# Patient Record
Sex: FEMALE | Race: WHITE | NOT HISPANIC OR LATINO
[De-identification: names, ages, dates, MRNs, and addresses within clinical notes are randomized per-mention and may not be internally consistent; named-entity substitution may affect disease eponyms.]

---

## 2020-07-01 PROBLEM — Z00.00 ENCOUNTER FOR PREVENTIVE HEALTH EXAMINATION: Status: ACTIVE | Noted: 2020-07-01

## 2023-03-30 NOTE — ASU PATIENT PROFILE, ADULT - NS PREOP UNDERSTANDS INFO
No solid food or milk products after 9:30pm 4/3/2023/ water or clear apple juice no later than 4am DOS/ photo ID and insurance card/ comfortable clothing/  will take  her home/yes

## 2023-03-30 NOTE — ASU PATIENT PROFILE, ADULT - NSICDXPASTSURGICALHX_GEN_ALL_CORE_FT
PAST SURGICAL HISTORY:  History of hip surgery Right    History of partial nephrectomy     S/P ACL reconstruction Left     PAST SURGICAL HISTORY:  History of hip surgery Right Replacement    History of partial nephrectomy     S/P ACL reconstruction Left

## 2023-03-31 RX ORDER — SODIUM CHLORIDE 9 MG/ML
1000 INJECTION, SOLUTION INTRAVENOUS
Refills: 0 | Status: DISCONTINUED | OUTPATIENT
Start: 2023-04-03 | End: 2023-04-03

## 2023-04-03 ENCOUNTER — TRANSCRIPTION ENCOUNTER (OUTPATIENT)
Age: 73
End: 2023-04-03

## 2023-04-03 ENCOUNTER — OUTPATIENT (OUTPATIENT)
Dept: OUTPATIENT SERVICES | Facility: HOSPITAL | Age: 73
LOS: 1 days | Discharge: HOME | End: 2023-04-03

## 2023-04-03 VITALS
WEIGHT: 126.1 LBS | TEMPERATURE: 97 F | DIASTOLIC BLOOD PRESSURE: 84 MMHG | RESPIRATION RATE: 16 BRPM | SYSTOLIC BLOOD PRESSURE: 138 MMHG | HEIGHT: 59 IN | HEART RATE: 64 BPM | OXYGEN SATURATION: 98 %

## 2023-04-03 VITALS
HEART RATE: 67 BPM | RESPIRATION RATE: 14 BRPM | SYSTOLIC BLOOD PRESSURE: 138 MMHG | DIASTOLIC BLOOD PRESSURE: 83 MMHG | TEMPERATURE: 98 F | OXYGEN SATURATION: 96 %

## 2023-04-03 DIAGNOSIS — Z98.890 OTHER SPECIFIED POSTPROCEDURAL STATES: Chronic | ICD-10-CM

## 2023-04-03 DIAGNOSIS — Z90.5 ACQUIRED ABSENCE OF KIDNEY: Chronic | ICD-10-CM

## 2023-04-03 DEVICE — LENS IOL TECNIS PROTEC ZCB00 21.5D
Type: IMPLANTABLE DEVICE | Site: LEFT (LEFT EYE) | Status: NON-FUNCTIONAL
Removed: 2023-04-03

## 2023-04-03 RX ORDER — ACETAMINOPHEN 500 MG
650 TABLET ORAL ONCE
Refills: 0 | Status: COMPLETED | OUTPATIENT
Start: 2023-04-03 | End: 2023-04-03

## 2023-04-03 RX ORDER — TROPICAMIDE 1 %
1 DROPS OPHTHALMIC (EYE)
Refills: 0 | Status: COMPLETED | OUTPATIENT
Start: 2023-04-03 | End: 2023-04-03

## 2023-04-03 RX ORDER — PHENYLEPHRINE HCL 2.5 %
1 DROPS OPHTHALMIC (EYE)
Refills: 0 | Status: COMPLETED | OUTPATIENT
Start: 2023-04-03 | End: 2023-04-03

## 2023-04-03 RX ORDER — KETOROLAC TROMETHAMINE 0.5 %
1 DROPS OPHTHALMIC (EYE)
Refills: 0 | Status: COMPLETED | OUTPATIENT
Start: 2023-04-03 | End: 2023-04-03

## 2023-04-03 RX ADMIN — Medication 1 DROP(S): at 07:21

## 2023-04-03 RX ADMIN — Medication 1 DROP(S): at 07:42

## 2023-04-03 RX ADMIN — Medication 1 DROP(S): at 07:31

## 2023-04-03 RX ADMIN — Medication 1 DROP(S): at 07:20

## 2023-04-03 RX ADMIN — Medication 650 MILLIGRAM(S): at 09:36

## 2023-04-03 RX ADMIN — Medication 1 DROP(S): at 07:43

## 2023-04-03 RX ADMIN — Medication 1 DROP(S): at 07:30

## 2023-04-03 NOTE — OPERATIVE REPORT - OPERATIVE RPOSRT DETAILS
DATE OF SURGERY: 04-    SURGEON NAME:  RAJ BECKMAN MD    ANESTHESIA: MAC    OPERATION: Cataract Extraction with Femtosecond Laser & IOL Implantation Left eye    INTRAOCULAR LENS:    PREOPERATIVE DIAGNOSIS: Cataract & Astigmatism Left eye    POSTOPERATIVE DIAGNOSIS: Same    COMPLICATIONS:	None    ESTIMATED BLOOD LOSS: <1 cc	    PROCEDURE:    The patient was brought to the Femtosecond laser suite at [facility] operating room.  The patient’s left eye was anesthetized with Tetracaine drops.  A Methylene blue marking pen was used to make alignment marks at the limbus at the 3 and 9 o’clock positions.  Next, programming of the laser was reviewed including the program for creation of corneal relaxing incisions for the correction of congenital astigmatism, nuclear lens softening, and capsulotomy.  The patient was then placed in a supine position.  The suction ring for the laser was placed onto the eye.   The fiducial marks on the suction ring were aligned with the Methylene blue marks that had been previously placed on the cornea.  Suction was applied and the suction ring was docked to the laser.   OCT scanning took place and all relevant ocular surfaces were identified.  Next, the pre-programmed treatments for the creation of the corneal relaxing incisions, nuclear lens softening, and capsulotomy were performed.   All pre-programmed treatments were successfully completed.   Next, the suction ring was removed.   The patient was taken to the main operating room.    The  left eye was prepped and draped in the usual fashion.  A wire lid speculum was inserted.  Additional numbing took place with the installation of more Tetracaine.  A Norman scissor was used to open the conjunctiva and Tenon’s in the inferotemporal quadrant.  Next, an anesthetic mixture consisting of 50% 4% Lidocaine, 50% 0.75% Marcaine, and an ampule of Wydase was injected using a BSS cannula.  A total of 3 cc was injected below Tenon’s capsule.  Next, a superior rectus traction suture was placed.  A fornix based conjunctival flap was raised using a Norman scissor.  Hemostasis was achieved using a wet field cautery.   Next, a 69 [place] blade was used to make a vertical incision of about 50% depth, 3 mm length, at 0.5 mm posterior to the limbus.  A [place] blade was then used to dissect a scleral tunnel to the arcades.  Next, a 15-degree superblade was used to create incisions at 10 and 2 o’clock positions.      Following this, a 2.75 mm keratome was used to open the main wound.   Next, MPF Lidocaine was injected into the anterior chamber followed by Epinephrine.  Next, Vision blue was injected into the anterior chamber and irrigated out after a period of 20 seconds.  The anterior chamber was then filled with Amvisc plus.  Next, a capsulorexis forcep was used to remove the anterior lens capsule.  Hydro dissection was then performed.  The lens nucleus was prolapsed into the anterior chamber.  The nucleus was then removed with the phaco tip of the GREGORIA Signature machine.  The cortex was removed with the Transformer IA tip.  The posterior capsule was polished with a [place] scratcher and the sub-incisional cortex was removed using a bimanual technique employing a [place] cannula.      The eye was then filled with Healon.  A posterior chamber intraocular lens was inserted.  The Healon was removed using the Transformer IA tip.  Miochol was injected to constrict the pupil.  The wound was closed superiorly using two interrupted 10-O nylon sutures with the knots buried into the cornea.  The side port incisions were infiltrated with BSS on an air cannula.  Next, injections of Ancef and Solumedrol were delivered sub-conjunctivally.  The traction suture was then cut and removed.  The speculum was removed.   Pilocarpine drops were then instilled into the eye.  The lid was closed.  Ilotycin ointment was applied to the lids.  A patch and shield were then applied.  The patient was returned to post-op holding area in good condition having tolerated the procedure well.      DATE OF SURGERY: 04-    SURGEON NAME:  RAJ BECKMAN MD    ANESTHESIA: MAC    OPERATION: Cataract Extraction with Femtosecond Laser & IOL Implantation Left eye    INTRAOCULAR LENS: CZB00 21.5 D    PREOPERATIVE DIAGNOSIS: Cataract & Astigmatism Left eye    POSTOPERATIVE DIAGNOSIS: Same    COMPLICATIONS:	None    ESTIMATED BLOOD LOSS: <1 cc	    PROCEDURE:    The patient was brought to the Femtosecond laser suite at [facility] operating room.  The patient’s left eye was anesthetized with Tetracaine drops.  A Methylene blue marking pen was used to make alignment marks at the limbus at the 3 and 9 o’clock positions.  Next, programming of the laser was reviewed including the program for creation of corneal relaxing incisions for the correction of congenital astigmatism, nuclear lens softening, and capsulotomy.  The patient was then placed in a supine position.  The suction ring for the laser was placed onto the eye.   The fiducial marks on the suction ring were aligned with the Methylene blue marks that had been previously placed on the cornea.  Suction was applied and the suction ring was docked to the laser.   OCT scanning took place and all relevant ocular surfaces were identified.  Next, the pre-programmed treatments for the creation of the corneal relaxing incisions, nuclear lens softening, and capsulotomy were performed.   All pre-programmed treatments were successfully completed.   Next, the suction ring was removed.   The patient was taken to the main operating room.    The  left eye was prepped and draped in the usual fashion.  A wire lid speculum was inserted.  Additional numbing took place with the installation of more Tetracaine.  A [place] scissor was used to open the conjunctiva and Tenon’s in the inferotemporal quadrant.  Next, an anesthetic mixture consisting of 50% 4% Lidocaine, 50% 0.75% Marcaine, and an ampule of Wydase was injected using a BSS cannula.  A total of 3 cc was injected below Tenon’s capsule.  Next, a superior rectus traction suture was placed.  A fornix based conjunctival flap was raised using a Norman scissor.  Hemostasis was achieved using a wet field cautery.   Next, a 69 [place] blade was used to make a vertical incision of about 50% depth, 3 mm length, at 0.5 mm posterior to the limbus.  A [place] blade was then used to dissect a scleral tunnel to the arcades.  Next, a 15-degree superblade was used to create incisions at 10 and 2 o’clock positions.      Following this, a 2.75 mm keratome was used to open the main wound.   Next, MPF Lidocaine was injected into the anterior chamber followed by Epinephrine.  Next, Vision blue was injected into the anterior chamber and irrigated out after a period of 20 seconds.  The anterior chamber was then filled with Amvisc plus.  Next, a capsulorexis forcep was used to remove the anterior lens capsule.  Hydro dissection was then performed.  The lens nucleus was prolapsed into the anterior chamber.  The nucleus was then removed with the phaco tip of the GREGORIA Signature machine.  The cortex was removed with the Transformer IA tip.  The posterior capsule was polished with a [place] scratcher and the sub-incisional cortex was removed using a bimanual technique employing a [place] cannula.      The eye was then filled with Healon.  A posterior chamber intraocular lens was insertedinto the capsular bag.  The Healon was removed using the Transformer IA tip.  Miochol was injected to constrict the pupil.  The wound was closed superiorly using two interrupted 10-O nylon sutures with the knots buried into the cornea.  The side port incisions were infiltrated with BSS on an air cannula.  Next, injections of Ancef and Solumedrol were delivered sub-conjunctivally.  The traction suture was then cut and removed.  The speculum was removed.   Pilocarpine drops were then instilled into the eye.  The lid was closed.  Ilotycin ointment was applied to the lids.  A patch and shield were then applied.  The patient was returned to post-op holding area in good condition having tolerated the procedure well.

## 2023-04-03 NOTE — ASU DISCHARGE PLAN (ADULT/PEDIATRIC) - ***IN THE EVENT THAT YOU DEVELOP A COMPLICATION AND YOU ARE UNABLE TO REACH YOUR OWN PHYSICIAN, YOU MAY CONTACT:
Received refill request for atorvastatin (LIPITOR) 10 MG tablet, metoPROLOL succinate (TOPROL-XL) 50 MG 24 hr tablet  Request is Not Approved   because refill protocol met approval criteria  LOV and or labs were verified to be correct  Refill sent to pharmacy  Last physical or [facility]V 08/22/2022   Upcoming appointment none       Pt still has current refills on pharmacy files.    Statement Selected

## 2023-04-03 NOTE — PRE-ANESTHESIA EVALUATION ADULT - NSANTHADDINFOFT_GEN_ALL_CORE
See TE dated 1/13/23 For conscious sedation Pt. accepts awareness to sight, sound, touch, pressure and memory of procedure.

## 2023-04-03 NOTE — ASU DISCHARGE PLAN (ADULT/PEDIATRIC) - NS MD DC FALL RISK RISK
For information on Fall & Injury Prevention, visit: [email or web address] OR  [email or web address] OR  https://www.cdc.gov/steadi/patient.html

## 2023-04-03 NOTE — ASU PREOP CHECKLIST - IV STARTED
[facility] Pharmacy Note:  Renal Dose Adjustment for Tramadol Sherley Houston    Jason Rodriguez has been prescribed Tramadol (ULTRAM) 50 mg orally every 6 hours as needed for pain.     CrCl is estimated to be 25.7 mL/min (based on SCr of 0.92 from 6/28/2017)    Her calculat no

## (undated) DEVICE — BLADE ALCON SURGICAL TUNNEL STRAIGHT MICRO FULL EDGE

## (undated) DEVICE — KNIFE ALCON STANDARD FULL HANDLE 15 DEG (PINK)

## (undated) DEVICE — CANNULA BD & CO SUBTENONS

## (undated) DEVICE — GLV 8 PROTEXIS (WHITE)

## (undated) DEVICE — DRAPE MICROSCOPE KNOB COVER SMALL (2 PCS)

## (undated) DEVICE — KNIFE ALCON CRESCENT ANGLED BEVEL UP 2.3MM (PINK)

## (undated) DEVICE — SUT SILK 6-0 18" TG140-8

## (undated) DEVICE — TRANSFORMER INTREPID I/A 0.3MM

## (undated) DEVICE — MARKING PEN W RULER

## (undated) DEVICE — SPEAR CELLULOSE 40410

## (undated) DEVICE — PACK ANTERIOR SEGMENT

## (undated) DEVICE — WARMING BLANKET LOWER ADULT

## (undated) DEVICE — SYR LUER LOK 10CC

## (undated) DEVICE — SLEEVE LAMINER INFUSION 19G

## (undated) DEVICE — FUSION PACK

## (undated) DEVICE — SUT ETHILON 10-0 12" CS160-6

## (undated) DEVICE — SOL IRR BAL SALT 500ML

## (undated) DEVICE — ELCTR ERASER BI-P BVL 45DEG 18G

## (undated) DEVICE — KNIFE FULL HANDLE ANGLE 2.75MM

## (undated) DEVICE — GOWN XXL